# Patient Record
Sex: MALE | Race: WHITE
[De-identification: names, ages, dates, MRNs, and addresses within clinical notes are randomized per-mention and may not be internally consistent; named-entity substitution may affect disease eponyms.]

---

## 2020-03-12 ENCOUNTER — HOSPITAL ENCOUNTER (EMERGENCY)
Dept: HOSPITAL 74 - JERFT | Age: 75
Discharge: HOME | End: 2020-03-12
Payer: COMMERCIAL

## 2020-03-12 VITALS — DIASTOLIC BLOOD PRESSURE: 46 MMHG | TEMPERATURE: 98.3 F | SYSTOLIC BLOOD PRESSURE: 99 MMHG | HEART RATE: 83 BPM

## 2020-03-12 VITALS — BODY MASS INDEX: 29.7 KG/M2

## 2020-03-12 DIAGNOSIS — E11.9: ICD-10-CM

## 2020-03-12 DIAGNOSIS — I10: ICD-10-CM

## 2020-03-12 DIAGNOSIS — R50.9: Primary | ICD-10-CM

## 2020-03-12 DIAGNOSIS — Z79.84: ICD-10-CM

## 2020-03-12 NOTE — PDOC
Attending Attestation





- Resident


Resident Name: Clayton Dickson





- ED Attending Attestation


I have performed the following: I have examined & evaluated the patient, The 

case was reviewed & discussed with the resident, I agree w/resident's findings &

plan, Exceptions are as noted





- HPI


HPI: 





03/12/20 23:23


See resident HPI





- Physicial Exam


PE: 





03/12/20 23:23


Agree with documented exam





- Medical Decision Making





03/12/20 23:23


74M accompanying wife who is also being evaluated states he had subjective 

fevers, no n/v, cough, sob, sick contacts, recent travel





VS BP 99/46, otherwise unremarkable


tylenol


re-eval





pt asymptomatic


dc











Discharge





- Discharge Information


Problems reviewed: Yes


Clinical Impression/Diagnosis: 


Fever


Qualifiers:


 Fever type: unspecified Qualified Code(s): R50.9 - Fever, unspecified





Condition: Stable


Disposition: HOME





- Follow up/Referral


Referrals: 


Herber Damico [Primary Care Provider] - 





- Patient Discharge Instructions


Patient Printed Discharge Instructions:  How to Avoid a Cold or Flu, DI for 

Common Cold


Additional Instructions: 


You came into the ER with a fever. We did a chest x-ray and a flu swab which 

were both negative. 





Please schedule a follow up with your primary care doctor in the next 3 to 5 

days





Come back to the ER with any new or worsening concerns. 





Thank you for coming to the [facility] ER. We hope you feel better soon! 





Print Language: Lithuanian





- Post Discharge Activity

## 2020-03-12 NOTE — PDOC
History of Present Illness





- General


Chief Complaint: Cold Symptoms


Stated Complaint: FEVER


Time Seen by Provider: 03/12/20 21:47


History Source: Patient


Exam Limitations: No Limitations





- History of Present Illness


Initial Comments: 








Cecil Hinojosa is a 73 yo M w a pmh of HTN and NIDDM who presents to the ER 

with his wife for a subjective fever of 3 days duration. He states he has also 

had some mid lower back pain. He reports that he would not have presented to the

hospital if not for his wife but bc he came for his wife he decided to be 

evaluated. Patient also endorses sporadic pain which radiates down his right leg

when he bends over. States this is chronic in nature and not related to his 

fever. 





He denies SOB, cough, productive sputum, nausea, vomiting, diarrhea, 

constipation, dysuria, frequency, urgency





PCP: Herber Damico


PSH: None reported


Social Hx: Denies smoking, drinkng or other substance abuse


Allergies: NKA, NKDA











Past History





- Past Medical History


Allergies/Adverse Reactions: 


                                    Allergies











Allergy/AdvReac Type Severity Reaction Status Date / Time


 


No Known Allergies Allergy   Verified 03/12/20 22:27











COPD: No


Diabetes: Yes


HTN: Yes





- Psycho Social/Smoking Cessation Hx


Smoking History: Never smoked


Hx Alcohol Use: No


Drug/Substance Use Hx: No





**Review of Systems





- Review of Systems


Able to Perform ROS?: Yes


Comments:: 








CONSTITUTIONAL:


Present: fever


Absent: no chills, no fatigue


EYES:


Absent: visual changes


ENT:


Absent: ear pain, no sore throat


CARDIOVASCULAR:


Absent: chest pain, no palpitations


RESPIRATORY:


Absent: cough, no SOB


GI:


Absent: abdominal pain, no nausea, no vomiting, no constipation, no diarrhea


GENITOURINARY:


Absent: dysuria, no frequency, no hematuria


MUSKULOSKELETAL:


Absent:  no arthralgia, no myalgia


SKIN:


Absent: rash


NEURO:


Absent: headache














*Physical Exam





- Vital Signs


                                Last Vital Signs











Temp Pulse Resp BP Pulse Ox


 


 98.3 F   83   19   99/46 L  97 


 


 03/12/20 20:42  03/12/20 20:42  03/12/20 20:42  03/12/20 20:42  03/12/20 20:42














- Physical Exam








GENERAL:


Well-appearing, well-nourished. No apparent distress.


HEENT:


Normocephalic, atraumatic. PERRL, EOM intact.


CARDIOVASCULAR:


Normal S1, S2. Regular rate and rhythm.


PULMONARY:


No evidence of respiratory distress. Lungs clear to auscultation bilaterally. No

wheezing, rales or rhonchi.


ABDOMEN:


Soft, non-distended, non-tender.


EXTREMITIES:


Normal ROM in all four extremities. No gross deformities.


SKIN:


Warm, dry.  No rash


NEUROLOGICAL:


No focal neurological deficits.











ED Treatment Course





- RADIOLOGY


Radiology Studies Ordered: 














 Category Date Time Status


 


 CHEST PA & LAT [RAD] Stat Radiology  03/12/20 22:04 Taken














Medical Decision Making





- Medical Decision Making











Cecil Hinojosa is a 73 yo M w a pmh of HTN and NIDDM who presents to the ER 

with his wife for a subjective fever of 3 days duration. He states he has also 

had some mid lower back pain. He reports that he would not have presented to the

 hospital if not for his wife but bc he came for his wife he decided to be 

evaluated. Patient also endorses sporadic pain which radiates down his right leg

 when he bends over. States this is chronic in nature and not related to his 

fever. 





Vital Signs











Temp Pulse Resp BP Pulse Ox


 


 98.3 F   83   19   99/46 L  97 


 


 03/12/20 20:42  03/12/20 20:42  03/12/20 20:42  03/12/20 20:42  03/12/20 20:42











DDx IBNLT: viral illness, PNA, influenza, sciatica





Plan: flu swab, cxr, tylenol, re-assess





flu swab: negative





CXR: Unremarkable





re-assessment: patient has no complaints after tylenol and is happy his wife 

will be admitted and taken care of





Disposition: Home with PCP FU























Discharge





- Discharge Information


Problems reviewed: Yes


Clinical Impression/Diagnosis: 


Fever


Qualifiers:


 Fever type: unspecified Qualified Code(s): R50.9 - Fever, unspecified





Condition: Stable


Disposition: HOME





- Admission


No





- Follow up/Referral


Referrals: 


Herber Damico [Primary Care Provider] - 





- Patient Discharge Instructions


Patient Printed Discharge Instructions:  How to Avoid a Cold or Flu, DI for 

Common Cold


Additional Instructions: 


You came into the ER with a fever. We did a chest x-ray and a flu swab which 

were both negative. 





Please schedule a follow up with your primary care doctor in the next 3 to 5 

days





Come back to the ER with any new or worsening concerns. 





Thank you for coming to the [facility] ER. We hope you feel better soon! 





Print Language: Danish





- Post Discharge Activity

## 2024-09-29 ENCOUNTER — HOSPITAL ENCOUNTER (EMERGENCY)
Dept: HOSPITAL 74 - JER | Age: 79
Discharge: HOME | End: 2024-09-29
Payer: COMMERCIAL

## 2024-09-29 VITALS — BODY MASS INDEX: 25.4 KG/M2

## 2024-09-29 VITALS
DIASTOLIC BLOOD PRESSURE: 85 MMHG | TEMPERATURE: 97 F | RESPIRATION RATE: 18 BRPM | SYSTOLIC BLOOD PRESSURE: 171 MMHG | HEART RATE: 82 BPM

## 2024-09-29 DIAGNOSIS — G89.29: ICD-10-CM

## 2024-09-29 DIAGNOSIS — M25.561: ICD-10-CM

## 2024-09-29 DIAGNOSIS — M25.562: ICD-10-CM

## 2024-09-29 DIAGNOSIS — I10: Primary | ICD-10-CM
